# Patient Record
Sex: MALE | Race: WHITE | URBAN - METROPOLITAN AREA
[De-identification: names, ages, dates, MRNs, and addresses within clinical notes are randomized per-mention and may not be internally consistent; named-entity substitution may affect disease eponyms.]

---

## 2019-07-09 ENCOUNTER — PRE VISIT (OUTPATIENT)
Dept: UROLOGY | Facility: CLINIC | Age: 54
End: 2019-07-09

## 2019-07-09 NOTE — TELEPHONE ENCOUNTER
MEDICAL RECORDS REQUEST   [facility]  Urology Clinic  [address] [place] [address]  PHONE: [phone]  Fax: [phone]        FUTURE VISIT INFORMATION                                                   MaldonadoYUNIER Benitez: 1965 scheduled for future visit at [facility]    APPOINTMENT INFORMATION:    Date: 19 2PM     Provider: Zeus Lin MD    Reason for Visit/Diagnosis: BPH    REFERRAL INFORMATION:    Referring provider:  Self    Specialty: N/A    Referring providers clinic:  N/A    Clinic contact number:  N/A    RECORDS REQUESTED FOR VISIT                                                     NOTES  STATUS/DETAILS   OFFICE NOTE from referring provider  no   OFFICE NOTE from other specialist  no   DISCHARGE SUMMARY from hospital  no   DISCHARGE REPORT from the ER  no   OPERATIVE REPORT  no   MEDICATION LIST  no     PRE-VISIT CHECKLIST      Record collection complete Yes- Recs in CE   Called and spoke with pt, recs with [facility] only - (recs in CE)   PSA 19     Appointment appropriately scheduled           (right time/right provider) Yes   MyChart activation If no, please explain: In process    Questionnaire complete If no, please explain: In process      Completed by: Simi Goff

## 2019-07-31 ENCOUNTER — PRE VISIT (OUTPATIENT)
Dept: UROLOGY | Facility: CLINIC | Age: 54
End: 2019-07-31

## 2019-07-31 NOTE — TELEPHONE ENCOUNTER
Reason for Visit: Consult    Diagnosis: BPH    Orders/Procedures/Records: Incoming records    Contact Patient: N/A    Rooming Requirements: DIP/PVR      Neelam Bonds  07/31/19  7:02 AM

## 2019-08-06 ENCOUNTER — OFFICE VISIT (OUTPATIENT)
Dept: UROLOGY | Facility: CLINIC | Age: 54
End: 2019-08-06
Payer: COMMERCIAL

## 2019-08-06 VITALS
WEIGHT: 155 LBS | HEIGHT: 70 IN | SYSTOLIC BLOOD PRESSURE: 110 MMHG | DIASTOLIC BLOOD PRESSURE: 75 MMHG | HEART RATE: 78 BPM | BODY MASS INDEX: 22.19 KG/M2

## 2019-08-06 DIAGNOSIS — N40.1 BPH ASSOCIATED WITH NOCTURIA: Primary | ICD-10-CM

## 2019-08-06 DIAGNOSIS — R35.1 BPH ASSOCIATED WITH NOCTURIA: Primary | ICD-10-CM

## 2019-08-06 PROBLEM — S62.009A CLOSED FRACTURE OF SCAPHOID: Status: ACTIVE | Noted: 2017-09-05

## 2019-08-06 PROBLEM — G89.29 CHRONIC BACK PAIN: Status: ACTIVE | Noted: 2017-05-01

## 2019-08-06 PROBLEM — M54.9 CHRONIC BACK PAIN: Status: ACTIVE | Noted: 2017-05-01

## 2019-08-06 PROBLEM — R29.898 HAND DYSFUNCTION: Status: ACTIVE | Noted: 2017-09-19

## 2019-08-06 LAB
APPEARANCE UR: CLEAR
BILIRUB UR QL: NORMAL
COLOR UR: YELLOW
GLUCOSE URINE: NORMAL MG/DL
HGB UR QL: NORMAL
KETONES UR QL: NORMAL MG/DL
LEUKOCYTE ESTERASE URINE: NORMAL
NITRITE UR QL STRIP: NORMAL
PH UR STRIP: 5.5 PH (ref 5–7)
PROTEIN ALBUMIN URINE: 30 MG/DL
SOURCE: NORMAL
SP GR UR STRIP: 1.03 (ref 1–1.03)
UROBILINOGEN UR QL STRIP: 0.2 EU/DL (ref 0.2–1)

## 2019-08-06 RX ORDER — DUTASTERIDE 0.5 MG/1
0.5 CAPSULE, LIQUID FILLED ORAL DAILY
COMMUNITY
Start: 2019-01-08

## 2019-08-06 RX ORDER — TAMSULOSIN HYDROCHLORIDE 0.4 MG/1
0.8 CAPSULE ORAL DAILY
COMMUNITY
Start: 2019-01-08

## 2019-08-06 ASSESSMENT — MIFFLIN-ST. JEOR: SCORE: 1554.33

## 2019-08-06 ASSESSMENT — ENCOUNTER SYMPTOMS
DYSURIA: 0
TASTE DISTURBANCE: 0
SINUS PAIN: 0
TROUBLE SWALLOWING: 0
HEMATURIA: 0
SINUS CONGESTION: 1
NECK MASS: 0
SMELL DISTURBANCE: 0
HOARSE VOICE: 0
FLANK PAIN: 0
SORE THROAT: 1
DIFFICULTY URINATING: 1

## 2019-08-06 ASSESSMENT — PAIN SCALES - GENERAL: PAINLEVEL: NO PAIN (0)

## 2019-08-06 NOTE — PROGRESS NOTES
Urology Clinic    Zeus Lin MD  Date of Service: 2019     Name: Maldonado Ndiaye  MRN: [id number]  Age: 53 year old  : 1965  Referring provider: Juan Nieto     Assessment and Plan:  Assessment:  Maldonado Ndiaye  is a 53 year old male with bothersome LUTS.     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    First mentioned to PCP ~ 5 years ago at which time he was started on Tamsulosin. States this provided minor relief of nocturia and day time hesitancy and frequency. He was then started on Dutasteride ~ 18 months ago with mild relief of symptoms. He presents today to discuss other medical and/or surgical options for treatment. States he has not been told he has an enlargened prostate on exam on prior exam. He has not had prior imaging. Notes he has a brother  with BPH.  Denies prior pelvic trauma, hematuria, dysuria or UTI.  Denies family hx of prostate cancer.      Uroflow and Post-Void Residual by Bladder Scan:    PVR: 50 mL     Standardized Questionnaire:  American Urological Association Symptom Score  SUM 15/35  QOL 3/6    Review of Systems:   Pertinent items are noted in HPI or as below, remainder of complete ROS is negative.      Active Medications:     Current Outpatient Medications:      dutasteride (AVODART) 0.5 MG capsule, Take 0.5 mg by mouth daily, Disp: , Rfl:      tamsulosin (FLOMAX) 0.4 MG capsule, Take 0.8 mg by mouth daily , Disp: , Rfl:       Allergies:   Sulfa drugs      Past Medical History:  Hand dysfunction   Closed displaced fracture of middle third of scaphoid bone of right  "wrist  Chronic back pain   BPH with nocturia        Past Surgical History:  Strabismus surgery in 1967    Family History:   Father: pancreatic cancer   Mother: breast cancer, depression   Brother: thyroid disease   Daughter: anxiety   Sister: hyperlipidemia  Son: ADHD      Social History:   No tobacco use.   No alcohol use.     Physical Exam:   Patient is a 53 year old  male   Vitals: Blood pressure 110/75, pulse 78, height 1.778 m (5' 10\"), weight 70.3 kg (155 lb).  General Appearance Adult: Alert, no acute distress, oriented  : deferred to cystoscopy    Imaging:   I have personally reviewed the results of the below imaging studies. The results were discussed with the patient.       Laboratory:   I personally reviewed all applicable laboratory data and went over findings with patient  Significant for:        Scribe Disclosure:  Sameer Thakkar MS3  [facility]     ILaure, a scribe, prepared the chart for today's encounter.     I, Zeus Lin saw and evaluated this patient and agree with the plan as stated above.  I personally performed all listed procedures. I spent >15 minutes with patient over 50% of which was spent face to face discussing BPH, LUTS and workup.               "

## 2019-08-06 NOTE — LETTER
2019       RE: Maldonado Ndiaye  [address]  [place] [facility] [address]     Dear Colleague,    Thank you for referring your patient, Maldonado Ndiaye, to the [facility] UROLOGY AND INST FOR PROSTATE AND UROLOGIC CANCERS at [facility]. Please see a copy of my visit note below.      Urology Clinic    Zeus Lin MD  Date of Service: 2019     Name: Maldonado Ndiaye  MRN: [id number]  Age: 53 year old  : 1965  Referring provider: Juan Nieto     Assessment and Plan:  Assessment:  Maldonado Ndiaye  is a 53 year old male with bothersome LUTS.     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    First mentioned to PCP ~ 5 years ago at which time he was started on Tamsulosin. States this provided minor relief of nocturia and day time hesitancy and frequency. He was then started on Dutasteride ~ 18 months ago with mild relief of symptoms. He presents today to discuss other medical and/or surgical options for treatment. States he has not been told he has an enlargened prostate on exam on prior exam. He has not had prior imaging. Notes he has a brother  with BPH.  Denies prior pelvic trauma, hematuria, dysuria or UTI.  Denies family hx of prostate cancer.      Uroflow and Post-Void Residual by Bladder Scan:    PVR: 50 mL     Standardized Questionnaire:  American Urological Association Symptom Score  SUM 15/35  QOL 3/6    Review of Systems:   Pertinent items are noted in HPI or as below, remainder of complete ROS is negative.      Active Medications:     Current Outpatient  "Medications:      dutasteride (AVODART) 0.5 MG capsule, Take 0.5 mg by mouth daily, Disp: , Rfl:      tamsulosin (FLOMAX) 0.4 MG capsule, Take 0.8 mg by mouth daily , Disp: , Rfl:       Allergies:   Sulfa drugs      Past Medical History:  Hand dysfunction   Closed displaced fracture of middle third of scaphoid bone of right wrist  Chronic back pain   BPH with nocturia        Past Surgical History:  Strabismus surgery in 1967    Family History:   Father: pancreatic cancer   Mother: breast cancer, depression   Brother: thyroid disease   Daughter: anxiety   Sister: hyperlipidemia  Son: ADHD      Social History:   No tobacco use.   No alcohol use.     Physical Exam:   Patient is a 53 year old  male   Vitals: Blood pressure 110/75, pulse 78, height 1.778 m (5' 10\"), weight 70.3 kg (155 lb).  General Appearance Adult: Alert, no acute distress, oriented  : deferred to cystoscopy    Imaging:   I have personally reviewed the results of the below imaging studies. The results were discussed with the patient.       Laboratory:   I personally reviewed all applicable laboratory data and went over findings with patient  Significant for:        Scribe Disclosure:  Sameer Thakkar MS3  [facility]     ILaure, a scribe, prepared the chart for today's encounter.     I, Zeus Lin saw and evaluated this patient and agree with the plan as stated above.  I personally performed all listed procedures. I spent >15 minutes with patient over 50% of which was spent face to face discussing BPH, LUTS and workup.      Again, thank you for allowing me to participate in the care of your patient.      Sincerely,    Zeus Lin MD      "

## 2019-08-06 NOTE — NURSING NOTE
"Chief Complaint   Patient presents with     Consult     BPH       Blood pressure 110/75, pulse 78, height 1.778 m (5' 10\"), weight 70.3 kg (155 lb). Body mass index is 22.24 kg/m .    Patient Active Problem List   Diagnosis     BPH associated with nocturia     Chronic back pain     Closed fracture of scaphoid     Hand dysfunction       Allergies   Allergen Reactions     Sulfa Drugs        Current Outpatient Medications   Medication Sig Dispense Refill     dutasteride (AVODART) 0.5 MG capsule Take 0.5 mg by mouth daily       tamsulosin (FLOMAX) 0.4 MG capsule Take 0.8 mg by mouth daily          Social History     Tobacco Use     Smoking status: Never Smoker     Smokeless tobacco: Never Used   Substance Use Topics     Alcohol use: None     Drug use: None       Neelam Bonds, EMT  8/6/2019  1:59 PM     "

## 2019-08-27 ENCOUNTER — PRE VISIT (OUTPATIENT)
Dept: UROLOGY | Facility: CLINIC | Age: 54
End: 2019-08-27

## 2019-08-27 NOTE — TELEPHONE ENCOUNTER
Reason for Visit: Cystoscopy    Diagnosis: BPH  Assess for urethral pathology, characterize BPH    Orders/Procedures/Records: Records available    Contact Patient: N/A    Rooming Requirements: Cystoscopy, UA dip/PVR      Neelam Bonds  08/27/19  12:39 PM

## 2019-09-17 ENCOUNTER — HOSPITAL ENCOUNTER (OUTPATIENT)
Facility: AMBULATORY SURGERY CENTER | Age: 54
End: 2019-09-17
Attending: UROLOGY
Payer: COMMERCIAL

## 2019-09-17 ENCOUNTER — OFFICE VISIT (OUTPATIENT)
Dept: UROLOGY | Facility: CLINIC | Age: 54
End: 2019-09-17
Payer: COMMERCIAL

## 2019-09-17 ENCOUNTER — TELEPHONE (OUTPATIENT)
Dept: UROLOGY | Facility: CLINIC | Age: 54
End: 2019-09-17

## 2019-09-17 VITALS
HEART RATE: 66 BPM | HEIGHT: 70 IN | WEIGHT: 156 LBS | BODY MASS INDEX: 22.33 KG/M2 | DIASTOLIC BLOOD PRESSURE: 75 MMHG | SYSTOLIC BLOOD PRESSURE: 114 MMHG

## 2019-09-17 DIAGNOSIS — R35.1 BPH ASSOCIATED WITH NOCTURIA: Primary | ICD-10-CM

## 2019-09-17 DIAGNOSIS — N40.1 BPH ASSOCIATED WITH NOCTURIA: Primary | ICD-10-CM

## 2019-09-17 RX ORDER — FLUTICASONE PROPIONATE 50 MCG
SPRAY, SUSPENSION (ML) NASAL PRN
COMMUNITY
Start: 2019-09-02

## 2019-09-17 ASSESSMENT — MIFFLIN-ST. JEOR: SCORE: 1558.86

## 2019-09-17 ASSESSMENT — PAIN SCALES - GENERAL: PAINLEVEL: NO PAIN (0)

## 2019-09-17 NOTE — LETTER
2019       RE: Maldonado Ndiaye  [address]  [facility] [address]     Dear Colleague,    Thank you for referring your patient, Maldonado Ndiaye, to the [facility] UROLOGY AND INST FOR PROSTATE AND UROLOGIC CANCERS at [facility]. Please see a copy of my visit note below.      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 Cystoscopy today demonstrated a small intravesical median projection of the prostate that I suspect is creating a valve type obstruction.    Plan:  We discussed the available surgical treatment options for BPH and went over the risk/benefit profile of each including retrograde ejaculation, bleeding, infection, damage to the urethra, prostate and bladder, urinary incontinence, pelvic pain, identification of incidental prostate cancer and need for additional intervention.    We discussed that UroLift and Rezum are the most minimally invasive treatment options with the lowest likelihood of side effects though these treatments are relatively new and long term data regarding durability is limited.  Additionally, functional improvement is less likely to be as dramatic relative to more invasive procedures. He is not a good candidate for UroLift given the intravesical protrustion. Patients typically have a period of increased symptoms akin to prostatitits for up to 4-6 weeks after Rezum.     We also discussed the transurethral tissue removing procedures including TURP and HoLEP and discussed that these procedures carry higher perioperative risk profiles but greater degree of symptom improvement and likely increased durability.  Additionally, we discussed that some degree of post-operative frequency, irritability and occasional  urinary leakage and frequency is common after these procedures though often is limited to the first three months of recovery.    After discussion, patient would like to proceed with laser resection and incision of the median lobe with possible lateral lobe resection. We discussed the expectation for immediate improvement in the force of stream and nocturia with long term improvement in frequency and urgency.     He will stop finasteride. He can make a trial of stopping Flomax but can resume the medication if his symptoms worsen.     He would like to scheduled surgery for around December. In the interim, he will try a trial of pelvic floor physical therapy for pelvic floor relaxation and biofeedback. Will call us back to confirm he is interested in surgery before proceeding.    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     First mentioned to PCP ~ 5 years ago at which time he was started on Tamsulosin. States this provided minor relief of nocturia and day time hesitancy and frequency. He was then started on Dutasteride ~ 18 months ago with mild relief of symptoms. He presents today to discuss other medical and/or surgical options for treatment. States he has not been told he has an enlargened prostate on exam on prior exam. He has not had prior imaging. Notes he has a brother  with BPH. Denies prior pelvic trauma, hematuria, dysuria or UTI.  Denies family hx of prostate cancer.    AUA: 19/35, QOL 3/6     Urinary Flow Rate  Peak Flow: 9.4 mL/s  Average Flow: 5.7 mL/s  Voided (mL): 237 mL  Residual Volume by Ultrasound: 5 mL  Character of Curve: Can not be characterized    Review of Systems:   Pertinent items are noted in HPI or as below, remainder of complete ROS is negative.      CYSTOSCOPY  After obtaining informed  consent, the patient was prepped and draped in the standard sterile fashion.  The 15 Bulgarian flexible cystoscope was inserted through the urethral meatus.      The anterior urethra was: normal without stricture.    The external sphincter was appropriately coapted.   The prostatic urethra demonstrated a small intravesical lip. Not much lateral hypertrophy.   The bladder neck was tight.   The bladder was unremarkable for tumors, erythema or stones.    The ureteral orifices were identified on each side in orthotopic position with efflux of clear urine.   There were no trabeculations.    On retroflexion there was the usual bladder neck hyperemia.    There is intravesical protrusion of the prostate.      ALEXANDRU: prostate is not very enlarged, anodular     The patient tolerated the procedure well without complication.      Laboratory:   I personally reviewed all applicable laboratory data and went over findings with patient  Significant for:    UA RESULTS:   Recent Labs   Lab Test 08/06/19   SG 1.030   URINEPH 5.5   NITRITE Neg     Scribe Disclosure:  ILaure, am serving as a scribe to document services personally performed by Zeus Lin MD at this visit, based upon the provider's statements to me. All documentation has been reviewed by the aforementioned provider prior to being entered into the official medical record.       Again, thank you for allowing me to participate in the care of your patient.      Sincerely,    Zeus Lin MD

## 2019-09-17 NOTE — LETTER
Date:September 24, 2019      Patient was self referred, no letter generated. Do not send.        [facility] Health Information

## 2019-09-17 NOTE — PROGRESS NOTES
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 Cystoscopy today demonstrated a small intravesical median projection of the prostate that I suspect is creating a valve type obstruction.    Plan:  We discussed the available surgical treatment options for BPH and went over the risk/benefit profile of each including retrograde ejaculation, bleeding, infection, damage to the urethra, prostate and bladder, urinary incontinence, pelvic pain, identification of incidental prostate cancer and need for additional intervention.    We discussed that UroLift and Rezum are the most minimally invasive treatment options with the lowest likelihood of side effects though these treatments are relatively new and long term data regarding durability is limited.  Additionally, functional improvement is less likely to be as dramatic relative to more invasive procedures. He is not a good candidate for UroLift given the intravesical protrustion. Patients typically have a period of increased symptoms akin to prostatitits for up to 4-6 weeks after Rezum.     We also discussed the transurethral tissue removing procedures including TURP and HoLEP and discussed that these procedures carry higher perioperative risk profiles but greater degree of symptom improvement and likely increased durability.  Additionally, we discussed that some degree of post-operative frequency, irritability and occasional urinary leakage and frequency is common after these procedures though often is limited to the first three months of recovery.    After discussion, patient would like to proceed with laser resection and incision of the median lobe with possible lateral lobe resection. We discussed the expectation for immediate  improvement in the force of stream and nocturia with long term improvement in frequency and urgency.     He will stop finasteride. He can make a trial of stopping Flomax but can resume the medication if his symptoms worsen.     He would like to scheduled surgery for around December. In the interim, he will try a trial of pelvic floor physical therapy for pelvic floor relaxation and biofeedback. Will call us back to confirm he is interested in surgery before proceeding.    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     First mentioned to PCP ~ 5 years ago at which time he was started on Tamsulosin. States this provided minor relief of nocturia and day time hesitancy and frequency. He was then started on Dutasteride ~ 18 months ago with mild relief of symptoms. He presents today to discuss other medical and/or surgical options for treatment. States he has not been told he has an enlargened prostate on exam on prior exam. He has not had prior imaging. Notes he has a brother  with BPH. Denies prior pelvic trauma, hematuria, dysuria or UTI.  Denies family hx of prostate cancer.    AUA: 19/35, QOL 3/6     Urinary Flow Rate  Peak Flow: 9.4 mL/s  Average Flow: 5.7 mL/s  Voided (mL): 237 mL  Residual Volume by Ultrasound: 5 mL  Character of Curve: Can not be characterized    Review of Systems:   Pertinent items are noted in HPI or as below, remainder of complete ROS is negative.      CYSTOSCOPY  After obtaining informed consent, the patient was prepped and draped in the standard sterile fashion.  The 15 Georgian flexible cystoscope was inserted through the urethral meatus.      The anterior urethra was: normal without stricture.    The external sphincter was appropriately coapted.   The prostatic urethra demonstrated a small  intravesical lip. Not much lateral hypertrophy.   The bladder neck was tight.   The bladder was unremarkable for tumors, erythema or stones.    The ureteral orifices were identified on each side in orthotopic position with efflux of clear urine.   There were no trabeculations.    On retroflexion there was the usual bladder neck hyperemia.    There is intravesical protrusion of the prostate.      ALEXANDRU: prostate is not very enlarged, anodular     The patient tolerated the procedure well without complication.      Laboratory:   I personally reviewed all applicable laboratory data and went over findings with patient  Significant for:    UA RESULTS:   Recent Labs   Lab Test 08/06/19   SG 1.030   URINEPH 5.5   NITRITE Neg     Scribe Disclosure:  ILaure, am serving as a scribe to document services personally performed by Zeus Lin MD at this visit, based upon the provider's statements to me. All documentation has been reviewed by the aforementioned provider prior to being entered into the official medical record.

## 2019-09-17 NOTE — PATIENT INSTRUCTIONS
Referral to pelvic floor physical therapy. You will get a call from an unknown number within the next 72 hours for physical therapy scheduling.     Our surgery schedulers will get into contact with you about scheduling your upcoming surgery.      It was a pleasure meeting with you today.  Thank you for allowing me and my team the privilege of caring for you today.  YOU are the reason we are here, and I truly hope we provided you with the excellent service you deserve.  Please let us know if there is anything else we can do for you so that we can be sure you are leaving completely satisfied with your care experience.

## 2019-09-17 NOTE — NURSING NOTE
"Chief Complaint   Patient presents with     Cystoscopy     BPH? assess for urethral pathology       Blood pressure 114/75, pulse 66, height 1.778 m (5' 10\"), weight 70.8 kg (156 lb). Body mass index is 22.38 kg/m .    Patient Active Problem List   Diagnosis     BPH associated with nocturia     Chronic back pain     Closed fracture of scaphoid     Hand dysfunction       Allergies   Allergen Reactions     Sulfa Drugs        Current Outpatient Medications   Medication Sig Dispense Refill     dutasteride (AVODART) 0.5 MG capsule Take 0.5 mg by mouth daily       fluticasone (FLONASE) 50 MCG/ACT nasal spray as needed       tamsulosin (FLOMAX) 0.4 MG capsule Take 0.8 mg by mouth daily          Social History     Tobacco Use     Smoking status: Never Smoker     Smokeless tobacco: Never Used   Substance Use Topics     Alcohol use: None     Drug use: None       Invasive Procedure Safety Checklist:    Procedure: Cystoscopy    Action: Complete sections and checkboxes as appropriate.    Pre-procedure:  1. Patient ID Verified with 2 identifiers (Benita and  or MRN) : YES    2. Procedure and site verified with patient/designee (when able) : YES    3. Accurate consent documentation in medical record : YES    4. H&P (or appropriate assessment) documented in medical record : N/A  H&P must be up to 30 days prior to procedure an updated within 24 hours of                 Procedure as applicable.     5. Relevant diagnostic and radiology test results appropriately labeled and displayed as applicable : YES    6. Blood products, implants, devices, and/or special equipment available for the procedure as applicable : YES    7. Procedure site(s) marked with provider initials [Exclusions: none] : NO    8. Marking not required. Reason : Yes  Procedure does not require site marking    Time Out:     Time-Out performed immediately prior to starting procedure, including verbal and active participation of all team members addressing: YES    1. " Correct patient identity.  2. Confirmed that the correct side and site are marked.  3. An accurate procedure to be done.  4. Agreement on the procedure to be done.  5. Correct patient position.  6. Relevant images and results are properly labeled and appropriately displayed.  7. The need to administer antibiotics or fluids for irrigation purposes during the procedure as applicable.  8. Safety precautions based on patient history or medication use.    During Procedure: Verification of correct person, site, and procedure occurs any time the responsibility for care of the patient is transferred to another member of the care team.    The following medication was given:     MEDICATION:  Lidocaine without epinephrine 2% jelly  ROUTE: Urethral  SITE: Urethra via the meatus  DOSE: 10 mL  LOT #: [id number]  : [employer]  EXPIRATION DATE: 5-2021  NDC#: [address]-89   Was there drug waste? No    Prior to med admin, verified patient identity using patient's name and date of birth.  Due to med administration, patient instructed to remain in clinic for 15 minutes  afterwards, and to report any adverse reaction to me immediately.    Drug Amount Wasted:  None.  Vial/Syringe: Syringe      Neelam Bonds  9/17/2019  10:12 AM

## 2019-09-17 NOTE — TELEPHONE ENCOUNTER
Patient is scheduled for surgery with Dr. Lin      Spoke or left message with: schedule in clinic    Date of Surgery: 12/5/19    Location: [facility] OR    Informed patient they will need an adult  yes    Pre-op with surgeon (if applicable): n/a    H&P: Scheduled with pcp    Additional imaging/appointments: n/a    Surgery packet: mailed 9/17/19     Additional comments: patient left without scheduling.  Surgery scheduled and packet mailed.

## 2019-11-12 ENCOUNTER — TRANSFERRED RECORDS (OUTPATIENT)
Dept: HEALTH INFORMATION MANAGEMENT | Facility: CLINIC | Age: 54
End: 2019-11-12

## 2019-11-21 ENCOUNTER — PATIENT OUTREACH (OUTPATIENT)
Dept: UROLOGY | Facility: CLINIC | Age: 54
End: 2019-11-21

## 2019-11-21 NOTE — PROGRESS NOTES
Spoke with patient to let him know that we received the pre-op history and physical. I called the clinic and he did not leave a urine specimen or blood work. He said he would go to his clinic in the next day or two to have this completed. Umu Ferraro RN

## 2019-11-27 ENCOUNTER — TRANSFERRED RECORDS (OUTPATIENT)
Dept: HEALTH INFORMATION MANAGEMENT | Facility: CLINIC | Age: 54
End: 2019-11-27

## 2019-12-02 ENCOUNTER — TELEPHONE (OUTPATIENT)
Dept: UROLOGY | Facility: CLINIC | Age: 54
End: 2019-12-02

## (undated) RX ORDER — LIDOCAINE HYDROCHLORIDE 20 MG/ML
JELLY TOPICAL
Status: DISPENSED
Start: 2019-09-17